# Patient Record
Sex: FEMALE | Race: WHITE | NOT HISPANIC OR LATINO | URBAN - METROPOLITAN AREA
[De-identification: names, ages, dates, MRNs, and addresses within clinical notes are randomized per-mention and may not be internally consistent; named-entity substitution may affect disease eponyms.]

---

## 2017-09-22 ENCOUNTER — APPOINTMENT (RX ONLY)
Dept: URBAN - METROPOLITAN AREA CLINIC 17 | Facility: CLINIC | Age: 67
Setting detail: DERMATOLOGY
End: 2017-09-22

## 2017-09-22 DIAGNOSIS — D17 BENIGN LIPOMATOUS NEOPLASM: ICD-10-CM

## 2017-09-22 DIAGNOSIS — Z41.9 ENCOUNTER FOR PROCEDURE FOR PURPOSES OTHER THAN REMEDYING HEALTH STATE, UNSPECIFIED: ICD-10-CM

## 2017-09-22 DIAGNOSIS — L57.0 ACTINIC KERATOSIS: ICD-10-CM

## 2017-09-22 DIAGNOSIS — L82.1 OTHER SEBORRHEIC KERATOSIS: ICD-10-CM

## 2017-09-22 PROBLEM — D17.21 BENIGN LIPOMATOUS NEOPLASM OF SKIN AND SUBCUTANEOUS TISSUE OF RIGHT ARM: Status: ACTIVE | Noted: 2017-09-22

## 2017-09-22 PROCEDURE — ? DEFER

## 2017-09-22 PROCEDURE — ? COSMETIC CONSULTATION: FILLERS

## 2017-09-22 PROCEDURE — 99213 OFFICE O/P EST LOW 20 MIN: CPT | Mod: 25

## 2017-09-22 PROCEDURE — 17000 DESTRUCT PREMALG LESION: CPT

## 2017-09-22 PROCEDURE — ? COUNSELING

## 2017-09-22 PROCEDURE — ? LIQUID NITROGEN

## 2017-09-22 ASSESSMENT — LOCATION DETAILED DESCRIPTION DERM
LOCATION DETAILED: RIGHT PROXIMAL POSTERIOR THIGH
LOCATION DETAILED: LEFT SUPERIOR HELIX
LOCATION DETAILED: LEFT INFERIOR MEDIAL MALAR CHEEK
LOCATION DETAILED: RIGHT DISTAL ULNAR DORSAL FOREARM
LOCATION DETAILED: RIGHT MEDIAL UPPER BACK
LOCATION DETAILED: LEFT CENTRAL BUCCAL CHEEK
LOCATION DETAILED: RIGHT INFERIOR MEDIAL MALAR CHEEK
LOCATION DETAILED: SUPERIOR MID FOREHEAD
LOCATION DETAILED: LEFT MEDIAL MALAR CHEEK
LOCATION DETAILED: LEFT SUPERIOR PARIETAL SCALP
LOCATION DETAILED: RIGHT MEDIAL BUCCAL CHEEK

## 2017-09-22 ASSESSMENT — LOCATION ZONE DERM
LOCATION ZONE: ARM
LOCATION ZONE: SCALP
LOCATION ZONE: LEG
LOCATION ZONE: EAR
LOCATION ZONE: FACE
LOCATION ZONE: TRUNK

## 2017-09-22 ASSESSMENT — LOCATION SIMPLE DESCRIPTION DERM
LOCATION SIMPLE: RIGHT CHEEK
LOCATION SIMPLE: RIGHT FOREARM
LOCATION SIMPLE: SCALP
LOCATION SIMPLE: RIGHT UPPER BACK
LOCATION SIMPLE: RIGHT POSTERIOR THIGH
LOCATION SIMPLE: SUPERIOR FOREHEAD
LOCATION SIMPLE: LEFT CHEEK
LOCATION SIMPLE: LEFT EAR

## 2017-09-22 NOTE — PROCEDURE: LIQUID NITROGEN
Number Of Freeze-Thaw Cycles: 1 freeze-thaw cycle
Duration Of Freeze Thaw-Cycle (Seconds): 3
Render Post-Care Instructions In Note?: yes
Detail Level: Simple
Post-Care Instructions: Written wound care provided
Consent: Verbal consent obtained from patient

## 2017-11-07 ENCOUNTER — APPOINTMENT (RX ONLY)
Dept: URBAN - METROPOLITAN AREA CLINIC 41 | Facility: CLINIC | Age: 67
Setting detail: DERMATOLOGY
End: 2017-11-07

## 2017-11-07 DIAGNOSIS — D17 BENIGN LIPOMATOUS NEOPLASM: ICD-10-CM

## 2017-11-07 PROBLEM — D17.21 BENIGN LIPOMATOUS NEOPLASM OF SKIN AND SUBCUTANEOUS TISSUE OF RIGHT ARM: Status: ACTIVE | Noted: 2017-11-07

## 2017-11-07 PROCEDURE — 11401 EXC TR-EXT B9+MARG 0.6-1 CM: CPT

## 2017-11-07 PROCEDURE — ? EXCISION

## 2017-11-07 PROCEDURE — A4550 SURGICAL TRAYS: HCPCS

## 2017-11-07 PROCEDURE — 12031 INTMD RPR S/A/T/EXT 2.5 CM/<: CPT

## 2017-11-07 ASSESSMENT — LOCATION SIMPLE DESCRIPTION DERM: LOCATION SIMPLE: RIGHT FOREARM

## 2017-11-07 ASSESSMENT — LOCATION ZONE DERM: LOCATION ZONE: ARM

## 2017-11-07 ASSESSMENT — LOCATION DETAILED DESCRIPTION DERM: LOCATION DETAILED: RIGHT VENTRAL DISTAL FOREARM

## 2017-12-06 ENCOUNTER — APPOINTMENT (RX ONLY)
Dept: URBAN - METROPOLITAN AREA CLINIC 17 | Facility: CLINIC | Age: 67
Setting detail: DERMATOLOGY
End: 2017-12-06

## 2017-12-06 DIAGNOSIS — L71.0 PERIORAL DERMATITIS: ICD-10-CM

## 2017-12-06 PROCEDURE — ? TREATMENT REGIMEN

## 2017-12-06 PROCEDURE — ? COUNSELING

## 2017-12-06 PROCEDURE — 99213 OFFICE O/P EST LOW 20 MIN: CPT

## 2017-12-06 PROCEDURE — ? PRESCRIPTION

## 2017-12-06 RX ORDER — DOXYCYCLINE 100 MG/1
1 CAPSULE ORAL QD
Qty: 30 | Refills: 1 | Status: ERX | COMMUNITY
Start: 2017-12-06

## 2017-12-06 RX ADMIN — DOXYCYCLINE 1: 100 CAPSULE ORAL at 22:24

## 2017-12-06 ASSESSMENT — LOCATION ZONE DERM: LOCATION ZONE: FACE

## 2017-12-06 ASSESSMENT — LOCATION SIMPLE DESCRIPTION DERM: LOCATION SIMPLE: CHIN

## 2017-12-06 ASSESSMENT — LOCATION DETAILED DESCRIPTION DERM: LOCATION DETAILED: LEFT MENTAL CREASE

## 2017-12-06 NOTE — HPI: RASH
How Severe Is Your Rash?: mild
Is This A New Presentation, Or A Follow-Up?: Rash
Additional History: Patient is here for a rash on her face. She states that she has a seborrheic keratosis frozen on her left cheek during her last visit which was healing well until she switched her moisturizer. Her face has since been red and flaky. She used Cicalfate on her face which didn't help. She also used over the counter Hydrocortisone cream on her face and neck as well as a couple new moisturizers and an antifungal cream on the corners of her mouth. Nothing she has tried has helped clear the rash.

## 2017-12-06 NOTE — PROCEDURE: TREATMENT REGIMEN
Samples Given: Jas once a day until resolved
Discontinue Regimen: All current topicals
Detail Level: Zone
Otc Regimen: Vanicream Moisturizer and Vaseline, cleanse skin with water
Initiate Treatment: Doxycycline 100mg QD until resolved
Plan: Discontinue all other topicals other than Elidel and moisturizer until rash has resolved then re introduce products and soap one at a time. Follow up in two months if not resolved

## 2018-04-18 ENCOUNTER — APPOINTMENT (RX ONLY)
Dept: URBAN - METROPOLITAN AREA CLINIC 17 | Facility: CLINIC | Age: 68
Setting detail: DERMATOLOGY
End: 2018-04-18

## 2018-04-18 DIAGNOSIS — D485 NEOPLASM OF UNCERTAIN BEHAVIOR OF SKIN: ICD-10-CM

## 2018-04-18 DIAGNOSIS — L21.8 OTHER SEBORRHEIC DERMATITIS: ICD-10-CM

## 2018-04-18 PROBLEM — D48.5 NEOPLASM OF UNCERTAIN BEHAVIOR OF SKIN: Status: ACTIVE | Noted: 2018-04-18

## 2018-04-18 PROCEDURE — 99213 OFFICE O/P EST LOW 20 MIN: CPT

## 2018-04-18 PROCEDURE — ? OTHER

## 2018-04-18 PROCEDURE — ? PRESCRIPTION

## 2018-04-18 PROCEDURE — ? COUNSELING

## 2018-04-18 PROCEDURE — ? TREATMENT REGIMEN

## 2018-04-18 RX ORDER — KETOCONAZOLE 20 MG/G
1 CREAM TOPICAL QOD
Qty: 1 | Refills: 3 | Status: ERX | COMMUNITY
Start: 2018-04-18

## 2018-04-18 RX ORDER — FLUTICASONE PROPIONATE 0.05 MG/G
1 OINTMENT TOPICAL QOD
Qty: 1 | Refills: 3 | Status: ERX | COMMUNITY
Start: 2018-04-18

## 2018-04-18 RX ADMIN — FLUTICASONE PROPIONATE 1: 0.05 OINTMENT TOPICAL at 18:42

## 2018-04-18 RX ADMIN — KETOCONAZOLE 1: 20 CREAM TOPICAL at 18:41

## 2018-04-18 ASSESSMENT — LOCATION DETAILED DESCRIPTION DERM
LOCATION DETAILED: LEFT SCAPHA
LOCATION DETAILED: RIGHT LOWER CUTANEOUS LIP
LOCATION DETAILED: LEFT LATERAL MALAR CHEEK

## 2018-04-18 ASSESSMENT — LOCATION SIMPLE DESCRIPTION DERM
LOCATION SIMPLE: RIGHT LIP
LOCATION SIMPLE: LEFT CHEEK
LOCATION SIMPLE: LEFT EAR

## 2018-04-18 ASSESSMENT — LOCATION ZONE DERM
LOCATION ZONE: EAR
LOCATION ZONE: LIP
LOCATION ZONE: FACE

## 2018-04-18 NOTE — PROCEDURE: TREATMENT REGIMEN
Initiate Treatment: Alternate Ketoconazole cream and Fluticasone ointment until adequately control. Repeat for flares
Detail Level: Detailed
Otc Regimen: Selsun Blue, Neutrogena T gel or T sal. Lather, let sit for 5-10 minutes before rinsing

## 2018-04-18 NOTE — PROCEDURE: OTHER
Note Text (......Xxx Chief Complaint.): This diagnosis correlates with the
Detail Level: Simple
Other (Free Text): Recommended letting it heal and scheduling an extraction facial once the inflammation has resolved. If the inflammation does not resolve or it enlarges, she should return to the clinic for a biopsy.

## 2018-08-22 ENCOUNTER — APPOINTMENT (RX ONLY)
Dept: URBAN - METROPOLITAN AREA CLINIC 17 | Facility: CLINIC | Age: 68
Setting detail: DERMATOLOGY
End: 2018-08-22

## 2018-08-22 DIAGNOSIS — L259 CONTACT DERMATITIS AND OTHER ECZEMA, UNSPECIFIED CAUSE: ICD-10-CM

## 2018-08-22 PROBLEM — L30.9 DERMATITIS, UNSPECIFIED: Status: ACTIVE | Noted: 2018-08-22

## 2018-08-22 PROCEDURE — ? COUNSELING

## 2018-08-22 PROCEDURE — 96372 THER/PROPH/DIAG INJ SC/IM: CPT

## 2018-08-22 PROCEDURE — ? SEPARATE AND IDENTIFIABLE DOCUMENTATION

## 2018-08-22 PROCEDURE — 99213 OFFICE O/P EST LOW 20 MIN: CPT | Mod: 25

## 2018-08-22 PROCEDURE — ? INTRAMUSCULAR KENALOG

## 2018-08-22 ASSESSMENT — LOCATION DETAILED DESCRIPTION DERM: LOCATION DETAILED: LEFT BUTTOCK

## 2018-08-22 ASSESSMENT — LOCATION ZONE DERM: LOCATION ZONE: TRUNK

## 2018-08-22 ASSESSMENT — LOCATION SIMPLE DESCRIPTION DERM: LOCATION SIMPLE: LEFT BUTTOCK

## 2018-08-22 NOTE — HPI: OTHER
Condition:: Acute facial swelling
Please Describe Your Condition:: comes in for a chief complaint of Acute facial swelling. New onset of right side facial swelling secondary to applying an over the counter anti-aging cream. Patient reports that she applied A Cream last night that she had not used for months. She states she woke up this morning with the right eye facial swelling.

## 2018-08-22 NOTE — PROCEDURE: INTRAMUSCULAR KENALOG
Lot # (Optional): [id number]
Consent: The risks of atrophy were reviewed with the patient.
Detail Level: None
Expiration Date (Optional): 12/2019
Administered By (Optional): Tiffanie Yee CMA
Concentration (Mg/Ml): 40.0
Kenalog Preparation: kenalog
Total Volume (Ccs): 1.5

## 2018-09-18 ENCOUNTER — APPOINTMENT (RX ONLY)
Dept: URBAN - METROPOLITAN AREA CLINIC 17 | Facility: CLINIC | Age: 68
Setting detail: DERMATOLOGY
End: 2018-09-18

## 2018-09-18 DIAGNOSIS — L82.1 OTHER SEBORRHEIC KERATOSIS: ICD-10-CM

## 2018-09-18 DIAGNOSIS — L81.4 OTHER MELANIN HYPERPIGMENTATION: ICD-10-CM

## 2018-09-18 DIAGNOSIS — F42.4 EXCORIATION (SKIN-PICKING) DISORDER: ICD-10-CM

## 2018-09-18 PROBLEM — L57.0 ACTINIC KERATOSIS: Status: ACTIVE | Noted: 2018-09-18

## 2018-09-18 PROBLEM — L30.9 DERMATITIS, UNSPECIFIED: Status: ACTIVE | Noted: 2018-09-18

## 2018-09-18 PROCEDURE — ? PRODUCT LINE (PHARMACEUTICALS)

## 2018-09-18 PROCEDURE — ? COUNSELING

## 2018-09-18 PROCEDURE — ? TREATMENT REGIMEN

## 2018-09-18 PROCEDURE — ? LIQUID NITROGEN (COSMETIC)

## 2018-09-18 ASSESSMENT — LOCATION SIMPLE DESCRIPTION DERM
LOCATION SIMPLE: RIGHT LIP
LOCATION SIMPLE: RIGHT FOREHEAD
LOCATION SIMPLE: RIGHT CHEEK

## 2018-09-18 ASSESSMENT — LOCATION ZONE DERM
LOCATION ZONE: LIP
LOCATION ZONE: FACE

## 2018-09-18 ASSESSMENT — LOCATION DETAILED DESCRIPTION DERM
LOCATION DETAILED: RIGHT CENTRAL MALAR CHEEK
LOCATION DETAILED: RIGHT LATERAL FOREHEAD
LOCATION DETAILED: RIGHT LOWER CUTANEOUS LIP

## 2018-09-18 NOTE — HPI: SKIN LESION
Is This A New Presentation, Or A Follow-Up?: Skin Lesions
How Severe Is Your Skin Lesion?: mild
Has Your Skin Lesion Been Treated?: not been treated
Is This A New Presentation, Or A Follow-Up?: Follow Up Skin Lesion

## 2018-09-18 NOTE — PROCEDURE: PRODUCT LINE (PHARMACEUTICALS)
Name Of Product 13: Dermatitis Foam
Product 38 Price (In Dollars - Numeric Only, No Special Characters Or $): 0.00
Product 31 Units: 0
Name Of Product 6: Clobetasol shampoo
Product 13 Application Directions: Apply to affected areas twice a day for two weeks taking one week off between treatments.
Product 4 Price (In Dollars - Numeric Only, No Special Characters Or $): 50.00
Product 3 Units: 1
Render Product Pricing In Note: Yes
Name Of Product 10: Acne Triple Gel Combination
Name Of Product 14: Seborrheic dermatitis
Product 3 Application Directions: Apply to entire affected areas nightly for 12 weeks.
Product 6 Application Directions: Wash the scalp two to three times a week.
Name Of Product 15: Actinic Keratosis gel
Product 12 Application Directions: Apply to scalp at night up to four days a week.
Product 4 Application Directions: Apply twice weekly at night and gradually work up to nightly as tolerated
Product 15 Price (In Dollars - Numeric Only, No Special Characters Or $): 30.00
Product 5 Application Directions: Apply pea size amount to entire face two times a week and work up to nightly as tolerated.
Name Of Product 1: Clobetasol Foam
Name Of Product 4: Tretinoin 0.025% cream
Name Of Product 3: Melasma Emulsion
Product 9 Application Directions: Apply to affected areas twice a day.
Product 10 Application Directions: Apply pea size amount to entire face twice a week and work up to nightly as tolerated.
Product 2 Application Directions: Apply to face once a day.
Product 8 Application Directions: Apply pea size amount to entire face 2-3 times a week working up to nightly as tolerated.
Name Of Product 7: Rosacea Triple Gel
Name Of Product 8: Tretinoin 0.1 % cream
Name Of Product 9: Tacrolimus Ointment 0.1 %
Name Of Product 5: Tretinoin 0.05% cream
Product 11 Application Directions: Apply pea size amount to entire face twice a week working up to nightly as tolerated.
Product 15 Application Directions: Apply to arms daily for 2-3 weeks
Detail Level: Zone
Product 7 Application Directions: Apply to effected areas once a day
Name Of Product 11: Tazarotene Hydrating Cream
Product 1 Application Directions: Apply to scalp nightly for two weeks and face 2-3 times a week. Take one week off between treatments . Use as needed for flares.
Name Of Product 12: Clobetasol Solution
Product 14 Application Directions: Apply to affected area twice a day for two weeks. Then reduce to 2-3 times s week as needed.

## 2018-09-18 NOTE — PROCEDURE: TREATMENT REGIMEN
Detail Level: Detailed
Plan: Discussed that it is likely a milia. Informed patient that once lesion becomes a small white bump, lesion can be extracted. Patient encouraged to not pick at lesion
Plan: Discussed that bleaching creams may be helpful. Discussed purchasing in office Melasma Emulsion. Recommended to allow liquid nitrogen sites to heal prior to starting use of Melasma Emulsion. Patient to start treatment in one month. Patient to apply once daily for up to 3 months. If too drying, patient to apply every other day

## 2019-01-10 ENCOUNTER — RX ONLY (OUTPATIENT)
Age: 69
Setting detail: RX ONLY
End: 2019-01-10

## 2019-01-10 RX ORDER — FLUTICASONE PROPIONATE 0.05 MG/G
1 OINTMENT TOPICAL QOD
Qty: 1 | Refills: 3 | Status: CANCELLED
Stop reason: CLARIF

## 2019-01-10 RX ORDER — KETOCONAZOLE 20 MG/G
1 CREAM TOPICAL QOD
Qty: 1 | Refills: 3 | Status: CANCELLED
Stop reason: CLARIF

## 2022-08-26 NOTE — PROCEDURE: LIQUID NITROGEN (COSMETIC)
Price (Use Numbers Only, No Special Characters Or $): 150
Number Of Freeze-Thaw Cycles: 2 freeze-thaw cycles
Consent: The patient's consent was obtained including but not limited to risks of crusting, scabbing, blistering, scarring, darker or lighter pigmentary change, recurrence, incomplete removal and infection.
Detail Level: Zone
Post-Care Instructions: I reviewed with the patient in detail post-care instructions. Patient is to wear sunprotection, and avoid picking at any of the treated lesions. Pt may apply Vaseline to crusted or scabbing areas.
Duration Of Freeze Thaw-Cycle (Seconds): 5
Total Number Of Lesions Treated: 1
I personally performed the service described in the documentation recorded by the scribe in my presence, and it accurately and completely records my words and actions.